# Patient Record
Sex: MALE | Race: WHITE
[De-identification: names, ages, dates, MRNs, and addresses within clinical notes are randomized per-mention and may not be internally consistent; named-entity substitution may affect disease eponyms.]

---

## 2019-10-21 ENCOUNTER — HOSPITAL ENCOUNTER (EMERGENCY)
Dept: HOSPITAL 56 - MW.ED | Age: 59
Discharge: HOME | End: 2019-10-21
Payer: COMMERCIAL

## 2019-10-21 DIAGNOSIS — E11.9: ICD-10-CM

## 2019-10-21 DIAGNOSIS — Z79.84: ICD-10-CM

## 2019-10-21 DIAGNOSIS — T18.128A: Primary | ICD-10-CM

## 2019-10-21 DIAGNOSIS — I10: ICD-10-CM

## 2019-10-21 DIAGNOSIS — Z79.899: ICD-10-CM

## 2019-10-21 PROCEDURE — 96361 HYDRATE IV INFUSION ADD-ON: CPT

## 2019-10-21 PROCEDURE — 70360 X-RAY EXAM OF NECK: CPT

## 2019-10-21 PROCEDURE — 96374 THER/PROPH/DIAG INJ IV PUSH: CPT

## 2019-10-21 PROCEDURE — 99283 EMERGENCY DEPT VISIT LOW MDM: CPT

## 2019-10-21 NOTE — CR
Soft tissue neck: AP and lateral views of the neck were obtained.  Tissue 
purposes.



Prevertebral soft tissues are normal.  Epiglottis is normal.  Severe disc space 
narrowing is noted at C5-C6 with anterior osteophytes.  Moderate disc space 
narrowing at C6-C7.  No discrete radiopaque foreign object is seen.



Impression:

1.  Degenerative change within the spine.

2.  Soft tissues of the neck are unremarkable.

3.  No discrete opaque foreign body is seen.



Diagnostic code #2
MTDD

## 2019-10-21 NOTE — EDM.PDOC
ED HPI GENERAL MEDICAL PROBLEM





- General


Chief Complaint: Gastrointestinal Problem


Stated Complaint: THROWING UP BLOOD


Time Seen by Provider: 10/21/19 11:54


Source of Information: Reports: Patient


History Limitations: Reports: No Limitations





- History of Present Illness


INITIAL COMMENTS - FREE TEXT/NARRATIVE: 


HISTORY AND PHYSICAL:





History of present illness:


Patient is a 59-year-old male who presents to the emergency room today with 

complaints of esophageal foreign body. He states that prior to arrival he was 

eating a chicken sandwich when he felt a piece of meat get stuck in his throat. 

He has had several episodes of emesis but still has the sensation and 

difficulty swallowing his saliva. He states he has had an esophageal food bolus 

previous which did require an EGD for extraction.





Patient denies any fever, chills, headache, change in vision, syncope or near 

syncope. Denies any chest pain, back pain, dyspnea, shortness of breath or 

cough. Denies any abdominal pain, nausea, vomiting, diarrhea, constipation or 

dysuria. Past medical history of HTN and DM II.





Review of systems: 


As per history of present illness and below otherwise all systems reviewed and 

negative.





Past medical history: 


As per history of present illness and as reviewed below otherwise 

noncontributory.





Surgical history: 


As per history of present illness and as reviewed below otherwise 

noncontributory.





Social history: 


See social history for further information





Family history: 


As per history of present illness and as reviewed below otherwise 

noncontributory.





Physical exam:


General: Well-developed and well-nourished 59-year-old male. Alert and 

oriented. Nontoxic appearing and in no acute distress.


HEENT: Atraumatic, normocephalic, pupils equal and reactive bilaterally, 

negative for conjunctival pallor or scleral icterus, mucous membranes moist, 

TMs normal bilaterally, throat clear, neck supple, nontender, trachea midline. 

No drooling or trismus noted. No meningeal signs. No hot potato voice noted. 


Lungs: Clear to auscultation, breath sounds equal bilaterally, chest nontender.


Heart: S1S2, regular rate and rhythm without overt murmur


Abdomen: Soft, nondistended, nontender. Negative for masses or 

hepatosplenomegaly. Negative for costovertebral tenderness.


Skin: Intact, warm, dry. No lesions or rashes noted.


Extremities: Atraumatic, moves all extremities per self without difficulty or 

deficits, negative for cords or calf pain. Neurovascular unremarkable.


Neuro: Awake, alert, oriented. Cranial nerves II through XII unremarkable. 

Cerebellum unremarkable. Motor and sensory unremarkable throughout. Exam 

nonfocal.





Notes:


Oral challenge was successful after the IV glucagon. Dr Nogueira was consulted 

on this case, as he was paged before patient was able to keep fluids down. He 

states that since this patient can now swallow and keep fluids down; to keep 

him on a bland clear/full liquid diet over the next few days. Patient has been 

able to keep liquids down and did attempt some applesauce. His vital signs have 

improved. Soft tissue x-ray shows no acute findings. Supportive care measures 

were reviewed and discussed.





Diagnostics:


Soft Tissue Neck





Therapeutics:


Glucagon, LR





Impression: 


Esophageal food bolus, resolved


History of hypertension





Plan:


1. Full liquid diet over the next 24-48 hours (cream soup, fluids, etc...). 

Advance as tolerated


2. Tylenol and/or ibuprofen as needed for pain management.


3. Follow up with your primary care provider and/or the general surgeon as 

discussed. Return to the ED as needed and as discussed.





Definitive disposition and diagnosis as appropriate pending reevaluation and 

review of above.





  ** epigastric


Pain Score (Numeric/FACES): 5





- Related Data


 Allergies











Allergy/AdvReac Type Severity Reaction Status Date / Time


 


No Known Allergies Allergy   Verified 10/21/19 11:57











Home Meds: 


 Home Meds





Aliskiren Hemifumarate [Tekturna] 300 mg PO DAILY 10/21/19 [History]


Hydrochlorothiazide [Microzide] 1 tab PO DAILY 10/21/19 [History]


Tamsulosin HCl [Flomax] 0.4 mg PO DAILY 10/21/19 [History]


atorvaSTATin [Lipitor] 10 mg PO DAILY 10/21/19 [History]


metFORMIN [Glucophage XR] 500 mg PO DAILY 10/21/19 [History]











Past Medical History


HEENT History: Reports: Other (See Below)


Other HEENT History: hx small esophagus


Cardiovascular History: Reports: Heart Murmur, Hypertension


Respiratory History: Reports: None


Gastrointestinal History: Reports: None


Genitourinary History: Reports: Prostate Disorder


Musculoskeletal History: Reports: None


Neurological History: Reports: None


Psychiatric History: Reports: None


Endocrine/Metabolic History: Reports: Diabetes, Type II


Hematologic History: Reports: None


Immunologic History: Reports: None


Oncologic (Cancer) History: Reports: Prostate


Dermatologic History: Reports: None





- Infectious Disease History


Infectious Disease History: Reports: Chicken Pox, Measles, Mumps





- Past Surgical History


Head Surgeries/Procedures: Reports: None


HEENT Surgical History: Reports: None


Cardiovascular Surgical History: Reports: None


Respiratory Surgical History: Reports: None


GI Surgical History: Reports: None


Male  Surgical History: Reports: None


Endocrine Surgical History: Reports: None


Neurological Surgical History: Reports: None


Musculoskeletal Surgical History: Reports: None


Oncologic Surgical History: Reports: None


Dermatological Surgical History: Reports: None





Social & Family History





- Family History


Family Medical History: Noncontributory





- Tobacco Use


Smoking Status *Q: Never Smoker


Second Hand Smoke Exposure: No





- Caffeine Use


Caffeine Use: Reports: Coffee





- Recreational Drug Use


Recreational Drug Use: Yes


Recreational Drug Type: Reports: Marijuana/Hashish


Recreational Drug Use Frequency: Socially





ED ROS ENT





- Review of Systems


Review Of Systems: ROS reveals no pertinent complaints other than HPI.





ED EXAM, ENT





- Physical Exam


Exam: See Below (See dictation)





Course





- Vital Signs


Last Recorded V/S: 


 Last Vital Signs











Temp  96.9 F   10/21/19 11:55


 


Pulse  118 H  10/21/19 11:55


 


Resp  18   10/21/19 11:55


 


BP  166/112 H  10/21/19 11:55


 


Pulse Ox  97   10/21/19 11:55














- Orders/Labs/Meds


Orders: 


 Active Orders 24 hr











 Category Date Time Status


 


 Lactated Ringers [Ringers, Lactated] 1,000 ml Med  10/21/19 12:15 Active





 IV ASDIRECTED   








 Medication Orders





Lactated Ringer's (Ringers, Lactated)  1,000 mls @ 150 mls/hr IV ASDIRECTED ADITYA


   Last Admin: 10/21/19 12:22  Dose: 150 mls/hr








Meds: 


Medications











Generic Name Dose Route Start Last Admin





  Trade Name Freq  PRN Reason Stop Dose Admin


 


Lactated Ringer's  1,000 mls @ 150 mls/hr  10/21/19 12:15  10/21/19 12:22





  Ringers, Lactated  IV   150 mls/hr





  ASDIRECTED ADITYA   Administration





     





     





     





     














Discontinued Medications














Generic Name Dose Route Start Last Admin





  Trade Name Freq  PRN Reason Stop Dose Admin


 


Al Hydroxide/Mg Hydroxide 15  0 ml  10/21/19 13:39  





  ml/ Lidocaine HCl 5 ml  PO  10/21/19 13:40  





  ONETIME ONE   





     





     





     





     


 


Glucagon  1 mg  10/21/19 12:06  10/21/19 12:22





  Glucagen  IVPUSH  10/21/19 12:07  1 mg





  ONETIME ONE   Administration





     





     





     





     














Departure





- Departure


Time of Disposition: 13:45


Disposition: Home, Self-Care 01


Clinical Impression: 


Esophageal foreign body


Qualifiers:


 Encounter type: initial encounter Qualified Code(s): T18.108A - Unspecified 

foreign body in esophagus causing other injury, initial encounter








- Discharge Information


Instructions:  Swallowed Foreign Body, Adult, Easy-to-Read


Referrals: 


PCP,None [Primary Care Provider] - 


Forms:  ED Department Discharge


Additional Instructions: 


The following information is given to patients seen in the emergency department 

who are being discharged to home. This information is to outline your options 

for follow-up care. We provide all patients seen in our emergency department 

with a follow-up referral.





The need for follow-up, as well as the timing and circumstances, are variable 

depending upon the specifics of your emergency department visit.





If you don't have a primary care physician on staff, we will provide you with a 

referral. We always advise you to contact your personal physician following an 

emergency department visit to inform them of the circumstance of the visit and 

for follow-up with them and/or the need for any referrals to a consulting 

specialist.





The emergency department will also refer you to a specialist when appropriate. 

This referral assures that you have the opportunity for follow-up care with a 

specialist. All of these measure are taken in an effort to provide you with 

optimal care, which includes your follow-up.





Under all circumstances we always encourage you to contact your private 

physician who remains a resource for coordinating your care. When calling for 

follow-up care, please make the office aware that this follow-up is from your 

recent emergency room visit. If for any reason you are refused follow-up, 

please contact the [facility] Emergency 

Department at [phone] and asked to speak to the emergency department 

charge nurse.





[facility]


Primary Care


[address] [place] [id number]


Phone: [phone]


Fax: [phone]





[address] [address]


Phone: [phone]


Fax: [phone]





1. Full liquid diet over the next 24-48 hours (cream soup, fluids, etc...). 

Advance as tolerated


2. Tylenol and/or ibuprofen as needed for pain management.


3. Follow up with your primary care provider and/or the general surgeon as 

discussed. Return to the ED as needed and as discussed.





- My Orders


Last 24 Hours: 


My Active Orders





10/21/19 12:15


Lactated Ringers [Ringers, Lactated] 1,000 ml IV ASDIRECTED 














- Assessment/Plan


Last 24 Hours: 


My Active Orders





10/21/19 12:15


Lactated Ringers [Ringers, Lactated] 1,000 ml IV ASDIRECTED

## 2020-02-17 NOTE — PCM.OPNOTE
- General Post-Op/Procedure Note


Date of Surgery/Procedure: 02/17/20


Operative Procedure(s): Esophagogastroduodenoscopy with duodenal, gastric and 

esophageal biopsies


Pre Op Diagnosis: Dysphagia.


Post-Op Diagnosis: Mild to moderate duodenitis.  Moderate to severe acute 

gastritis.  Severe erosive esophagitis.


Anesthesia Technique: MAC (ASA II)


Primary Surgeon: Bogdan Nogueira


Condition: Good


Free Text/Narrative:: 





DICTATION [id number]





CPT CODE 96605

## 2020-02-17 NOTE — PCM48HPAN
Post Anesthesia Note





- EVALUATION WITHIN 48HRS OF ANESTHETIC


Vital Signs in Normal Range: Yes


Patient Participated in Evaluation: Yes


Respiratory Function Stable: Yes


Airway Patent: Yes


Cardiovascular Function Stable: Yes


Hydration Status Stable: Yes


Pain Control Satisfactory: Yes


Nausea and Vomiting Control Satisfactory: Yes


Mental Status Recovered: Yes


Vital Signs: 


 Last Vital Signs











Temp  37.3 C   02/17/20 11:28


 


Pulse  77   02/17/20 11:43


 


Resp  14   02/17/20 11:43


 


BP  112/72   02/17/20 11:43


 


Pulse Ox  95   02/17/20 11:43














- COMMENTS/OBSERVATIONS


Free Text/Narrative:: 





no anesthesia problems

## 2020-02-17 NOTE — PCM.PREANE
Preanesthetic Assessment





- Anesthesia/Transfusion/Family Hx


Anesthesia History: Prior Anesthesia Without Reaction


Family History of Anesthesia Reaction: No


Transfusion History: No Prior Transfusion(s)


Intubation History: Unknown





- Review of Systems


General: No Symptoms


Pulmonary: No Symptoms


Cardiovascular: No Symptoms


Gastrointestinal: Difficulty Swallowing


Neurological: No Symptoms


Other: Reports: None





- Physical Assessment


Height: 5 ft 10 in


Weight: 91.172 kg


ASA Class: 2


Mental Status: Alert & Oriented x3


Airway Class: Mallampati = 2


Dentition: Reports: Normal Dentition


Thyro-Mental Finger Breadths: 3


Mouth Opening Finger Breadths: 3


ROM/Head Extension: Full


Lungs: Clear to Auscultation, Normal Respiratory Effort


Cardiovascular: Regular Rate, Regular Rhythm





- Allergies


Allergies/Adverse Reactions: 


 Allergies











Allergy/AdvReac Type Severity Reaction Status Date / Time


 


No Known Allergies Allergy   Verified 02/11/20 07:37














- Blood


Blood Available: No





- Anesthesia Plan


Pre-Op Medication Ordered: None





- Acknowledgements


Anesthesia Type Planned: MAC


Pt an Appropriate Candidate for the Planned Anesthesia: Yes


Alternatives and Risks of Anesthesia Discussed w Pt/Guardian: Yes


Pt/Guardian Understands and Agrees with Anesthesia Plan: Yes





PreAnesthesia Questionnaire


HEENT History: Reports: Other (See Below)


Other HEENT History: uses reading glasses


Cardiovascular History: Reports: Heart Murmur, High Cholesterol, Hypertension


Respiratory History: Reports: Asthma


Other Respiratory History: had Asthma as a child- has not used an inhaler since 

then


Gastrointestinal History: 


Other Gastrointestinal History: hx of dysphagia


Genitourinary History: Reports: Prostate Disorder


Other Genitourinary History: hx of Prostate cancer


Musculoskeletal History: Reports: Fracture


Other Musculoskeletal History: hx of fx wrist


Neurological History: Reports: Concussion


Psychiatric History: Reports: None


Endocrine/Metabolic History: Reports: Other (See Below)


Other Endocrine/Metabolic History: Pre-diabetic, om metformin


Hematologic History: Reports: None


Immunologic History: Reports: None


Oncologic (Cancer) History: Reports: Prostate (radioactive seeds placed in 07/19

)


Dermatologic History: Reports: None





- Infectious Disease History


Infectious Disease History: Reports: Chicken Pox, Measles, Mumps





- Past Surgical History


Head Surgeries/Procedures: Reports: None


HEENT Surgical History: Reports: Oral Surgery


Other HEENT Surgeries/Procedures: has upper and lower dental implants


Cardiovascular Surgical History: Reports: None


Respiratory Surgical History: Reports: None


GI Surgical History: Reports: EGD (10 years ago), Hernia, Inguinal


Other GI Surgeries/Procedures: hx of FB removal from esophagus


Male  Surgical History: Reports: Other (See Below)


Other Male  Surgeries/Procedures: had radiation seeds implanted in prostate


Endocrine Surgical History: Reports: None


Neurological Surgical History: Reports: None


Musculoskeletal Surgical History: Reports: Other (See Below)


Other Musculoskeletal Surgeries/Procedures:: Osteotomy left knee (has 2 screws)


Oncologic Surgical History: Reports: Other (See Below)


Other Oncologic Surgeries/Procedures: radiation seeds implanted in prostate


Dermatological Surgical History: Reports: None





- SUBSTANCE USE


Smoking Status *Q: Never Smoker


Recreational Drug Use History: No





- HOME MEDS


Home Medications: 


 Home Meds





Tamsulosin HCl [Flomax] 0.4 mg PO DAILY 10/21/19 [History]


atorvaSTATin [Lipitor] 10 mg PO DAILY 10/21/19 [History]


metFORMIN [Glucophage XR] 1,000 mg PO DAILY 10/21/19 [History]


Diltiazem HCl [Cardizem LA] 300 mg PO DAILY 02/11/20 [History]


Sildenafil Citrate 3 - 5 tab PO ASDIRECTED PRN 02/11/20 [History]


Tadalafil [Cialis] 20 mg PO ASDIRECTED 02/11/20 [History]


hydroCHLOROthiazide [Hydrochlorothiazide] 25 mg PO DAILY 02/11/20 [History]


Aliskiren Hemifumarate [Tekturna] 300 mg PO QAM 02/12/20 [History]











- CURRENT (IN HOUSE) MEDS


Current Meds: 





 Current Medications





Albuterol (Proventil Neb Soln)  2.5 mg NEB ONETIME PRN


   PRN Reason: Wheezing


Atropine Sulfate (Atropine 0.1 Mg/Ml)  1 mg IVPUSH ASDIRECTED PRN


   PRN Reason: Hypo-Perfusion


Dextrose/Water (Dextrose 50% In Water)  50 ml IVPUSH ASDIRECTED PRN


   PRN Reason: Hypoglycemia


Epinephrine HCl (Epinephrine 1:10,000)  1 mg IVPUSH ASDIRECTED PRN


   PRN Reason: ACLS Guidelines


Fentanyl (Sublimaze)  50 mcg IVPUSH Q5M PRN


   PRN Reason: Pain


Lactated Ringer's (Ringers, Lactated)  1,000 mls @ 125 mls/hr IV ASDIRECTED ADITYA


Naloxone HCl (Narcan)  0.1 mg IVPUSH ASDIRECTED PRN


   PRN Reason: Respiratory Depression





Discontinued Medications





Atropine Sulfate (Atropine 0.1 Mg/Ml)  0.5 mg IVPUSH ASDIRECTED PRN


   PRN Reason: Hypo-perfusion


   Stop: 02/17/20 09:11


Fentanyl (Sublimaze) Confirm Administered Dose 100 mcg .ROUTE .STK-MED ONE


   Stop: 02/17/20 07:16


Midazolam HCl (Versed 1 Mg/Ml) Confirm Administered Dose 2 mg .ROUTE .STK-MED 

ONE


   Stop: 02/17/20 07:16


Propofol (Diprivan  20 Ml) Confirm Administered Dose 400 mg .ROUTE .STK-MED ONE


   Stop: 02/17/20 07:16

## 2020-02-17 NOTE — OR
SURGEON:

Bogdan Nogueira M.D.

 

DATE OF PROCEDURE:  02/17/2020

 

OPERATION PERFORMED:

Esophagogastroduodenoscopy with duodenal, gastric, and esophageal biopsies.

 

PRIMARY SURGEON:

Bogdan Nogueira MD.

 

ANESTHESIA:

MAC.

 

ASA CLASSIFICATION:

II.

 

PREOPERATIVE DIAGNOSIS:

Dysphagia.

 

POSTOPERATIVE DIAGNOSES:

1. Acute duodenitis.

2. Acute gastritis.

3. Severe erosive esophagitis.

 

DESCRIPTION OF PROCEDURE:

The patient was taken to the endoscopy room, positioned on the endoscopy table

in the supine position.  Time-out was called for appropriate identification of

the patient and procedure.  Monitored anesthesia care was provided.  The bite

block was placed between the patient's teeth.  The gastroscope was inserted

through the bite block and advanced without difficulty through the esophagus and

stomach into the duodenum where examination was now carried out in a retrograde

fashion.  The duodenum did show a moderately acute gastritis.  Duodenal biopsies

were obtained.  The gastroscope was then withdrawn to the stomach which also

showed an acute gastritis.  Antral biopsies were obtained to look for the

presence of Helicobacter pylori.  The gastroscope was retroflexed to visualize

the proximal stomach.  No significant hiatal hernia was noted on examination.

No tumors or ulcers were noted in the proximal stomach.  The gastroscope was

then withdrawn to the GE junction.  The distal 7 to 8 cm of esophagus showed a

very severe esophagitis.  Several biopsies of the distal esophagus were

obtained.  The esophagus itself demonstrated good contractility.  The mid and

proximal esophagus demonstrated normal and healthy-appearing mucosa with no

erosions or ulcerations.  The vocal cords were visualized as the scope was

withdrawn and noted to move symmetrically.  No vocal cord lesions were

identified.  The gastroscope was then removed with the patient having tolerated

the procedure well.  He was taken to recovery room in stable condition.

 

 

PRAFUL / NEPTALI

DD:  02/17/2020 11:28:02

DT:  02/17/2020 11:46:29

Job #:  [id number]

## 2021-06-18 NOTE — PCM.POSTAN
POST ANESTHESIA ASSESSMENT





- MENTAL STATUS


Mental Status: Alert, Oriented





- VITAL SIGNS


Vital Signs: 


 Last Vital Signs











Temp  99.1 F   02/17/20 11:28


 


Pulse  78   02/17/20 11:38


 


Resp  12   02/17/20 11:38


 


BP  120/75   02/17/20 11:38


 


Pulse Ox  95   02/17/20 11:38














- RESPIRATORY


Respiratory Status: Respiratory Rate WNL, Airway Patent, O2 Saturation Stable





- CARDIOVASCULAR


CV Status: Pulse Rate WNL, Blood Pressure Stable





- GASTROINTESTINAL


GI Status: No Symptoms





- PAIN


Pain Score: 0





- POST OP HYDRATION


Hydration Status: Adequate & Stable





- OBSERVATIONS


Free Text/Narrative:: 


Pt stable for discharge to phase II recovery. No